# Patient Record
Sex: MALE | Race: BLACK OR AFRICAN AMERICAN | Employment: FULL TIME | URBAN - METROPOLITAN AREA
[De-identification: names, ages, dates, MRNs, and addresses within clinical notes are randomized per-mention and may not be internally consistent; named-entity substitution may affect disease eponyms.]

---

## 2017-07-31 ENCOUNTER — APPOINTMENT (OUTPATIENT)
Dept: GENERAL RADIOLOGY | Age: 47
End: 2017-07-31
Attending: EMERGENCY MEDICINE
Payer: OTHER GOVERNMENT

## 2017-07-31 ENCOUNTER — HOSPITAL ENCOUNTER (EMERGENCY)
Age: 47
Discharge: HOME | End: 2017-07-31
Attending: EMERGENCY MEDICINE
Payer: OTHER GOVERNMENT

## 2017-07-31 VITALS
DIASTOLIC BLOOD PRESSURE: 77 MMHG | OXYGEN SATURATION: 98 % | RESPIRATION RATE: 13 BRPM | BODY MASS INDEX: 26.48 KG/M2 | SYSTOLIC BLOOD PRESSURE: 140 MMHG | HEIGHT: 70 IN | HEART RATE: 56 BPM | WEIGHT: 185 LBS

## 2017-07-31 DIAGNOSIS — M25.519 ARTHRALGIA OF SHOULDER, UNSPECIFIED LATERALITY: ICD-10-CM

## 2017-07-31 DIAGNOSIS — R07.9 CHEST PAIN, UNSPECIFIED TYPE: Primary | ICD-10-CM

## 2017-07-31 LAB
ANION GAP BLD CALC-SCNC: 8 MMOL/L (ref 3–18)
ATRIAL RATE: 65 BPM
BASOPHILS # BLD AUTO: 0 K/UL (ref 0–0.06)
BASOPHILS # BLD: 1 % (ref 0–2)
BUN SERPL-MCNC: 9 MG/DL (ref 7–18)
BUN/CREAT SERPL: 8 (ref 12–20)
CALCIUM SERPL-MCNC: 7.9 MG/DL (ref 8.5–10.1)
CALCULATED P AXIS, ECG09: 37 DEGREES
CALCULATED R AXIS, ECG10: -34 DEGREES
CALCULATED T AXIS, ECG11: -6 DEGREES
CHLORIDE SERPL-SCNC: 107 MMOL/L (ref 100–108)
CK MB CFR SERPL CALC: 0.4 % (ref 0–4)
CK MB CFR SERPL CALC: NORMAL % (ref 0–4)
CK MB SERPL-MCNC: 1.1 NG/ML (ref 5–25)
CK MB SERPL-MCNC: <1 NG/ML (ref 5–25)
CK SERPL-CCNC: 233 U/L (ref 39–308)
CK SERPL-CCNC: 266 U/L (ref 39–308)
CO2 SERPL-SCNC: 30 MMOL/L (ref 21–32)
CREAT SERPL-MCNC: 1.11 MG/DL (ref 0.6–1.3)
D DIMER PPP FEU-MCNC: <0.27 UG/ML(FEU)
DIAGNOSIS, 93000: NORMAL
DIFFERENTIAL METHOD BLD: ABNORMAL
EOSINOPHIL # BLD: 0.3 K/UL (ref 0–0.4)
EOSINOPHIL NFR BLD: 4 % (ref 0–5)
ERYTHROCYTE [DISTWIDTH] IN BLOOD BY AUTOMATED COUNT: 13.1 % (ref 11.6–14.5)
GLUCOSE SERPL-MCNC: 138 MG/DL (ref 74–99)
HCT VFR BLD AUTO: 45.3 % (ref 36–48)
HGB BLD-MCNC: 15.3 G/DL (ref 13–16)
LIPASE SERPL-CCNC: 272 U/L (ref 73–393)
LYMPHOCYTES # BLD AUTO: 35 % (ref 21–52)
LYMPHOCYTES # BLD: 2.3 K/UL (ref 0.9–3.6)
MCH RBC QN AUTO: 32.8 PG (ref 24–34)
MCHC RBC AUTO-ENTMCNC: 33.8 G/DL (ref 31–37)
MCV RBC AUTO: 97.2 FL (ref 74–97)
MONOCYTES # BLD: 0.4 K/UL (ref 0.05–1.2)
MONOCYTES NFR BLD AUTO: 7 % (ref 3–10)
NEUTS SEG # BLD: 3.5 K/UL (ref 1.8–8)
NEUTS SEG NFR BLD AUTO: 53 % (ref 40–73)
P-R INTERVAL, ECG05: 156 MS
PLATELET # BLD AUTO: 176 K/UL (ref 135–420)
PMV BLD AUTO: 9.2 FL (ref 9.2–11.8)
POTASSIUM SERPL-SCNC: 3.7 MMOL/L (ref 3.5–5.5)
Q-T INTERVAL, ECG07: 414 MS
QRS DURATION, ECG06: 94 MS
QTC CALCULATION (BEZET), ECG08: 430 MS
RBC # BLD AUTO: 4.66 M/UL (ref 4.7–5.5)
SODIUM SERPL-SCNC: 145 MMOL/L (ref 136–145)
TROPONIN I SERPL-MCNC: <0.02 NG/ML (ref 0–0.06)
TROPONIN I SERPL-MCNC: <0.02 NG/ML (ref 0–0.06)
VENTRICULAR RATE, ECG03: 65 BPM
WBC # BLD AUTO: 6.6 K/UL (ref 4.6–13.2)

## 2017-07-31 PROCEDURE — 80048 BASIC METABOLIC PNL TOTAL CA: CPT | Performed by: EMERGENCY MEDICINE

## 2017-07-31 PROCEDURE — 93005 ELECTROCARDIOGRAM TRACING: CPT

## 2017-07-31 PROCEDURE — 83690 ASSAY OF LIPASE: CPT | Performed by: EMERGENCY MEDICINE

## 2017-07-31 PROCEDURE — 85025 COMPLETE CBC W/AUTO DIFF WBC: CPT | Performed by: EMERGENCY MEDICINE

## 2017-07-31 PROCEDURE — 96361 HYDRATE IV INFUSION ADD-ON: CPT

## 2017-07-31 PROCEDURE — 99285 EMERGENCY DEPT VISIT HI MDM: CPT

## 2017-07-31 PROCEDURE — 85379 FIBRIN DEGRADATION QUANT: CPT | Performed by: EMERGENCY MEDICINE

## 2017-07-31 PROCEDURE — 71010 XR CHEST PORT: CPT

## 2017-07-31 PROCEDURE — 82550 ASSAY OF CK (CPK): CPT | Performed by: EMERGENCY MEDICINE

## 2017-07-31 PROCEDURE — 96374 THER/PROPH/DIAG INJ IV PUSH: CPT

## 2017-07-31 PROCEDURE — 74011250636 HC RX REV CODE- 250/636: Performed by: EMERGENCY MEDICINE

## 2017-07-31 PROCEDURE — 74011250637 HC RX REV CODE- 250/637: Performed by: EMERGENCY MEDICINE

## 2017-07-31 PROCEDURE — 96375 TX/PRO/DX INJ NEW DRUG ADDON: CPT

## 2017-07-31 RX ORDER — MORPHINE SULFATE 4 MG/ML
4 INJECTION, SOLUTION INTRAMUSCULAR; INTRAVENOUS
Status: DISCONTINUED | OUTPATIENT
Start: 2017-07-31 | End: 2017-07-31

## 2017-07-31 RX ORDER — ONDANSETRON 2 MG/ML
4 INJECTION INTRAMUSCULAR; INTRAVENOUS
Status: DISCONTINUED | OUTPATIENT
Start: 2017-07-31 | End: 2017-07-31

## 2017-07-31 RX ORDER — GUAIFENESIN 100 MG/5ML
81 LIQUID (ML) ORAL
Status: COMPLETED | OUTPATIENT
Start: 2017-07-31 | End: 2017-07-31

## 2017-07-31 RX ORDER — SODIUM CHLORIDE 9 MG/ML
1000 INJECTION, SOLUTION INTRAVENOUS ONCE
Status: COMPLETED | OUTPATIENT
Start: 2017-07-31 | End: 2017-07-31

## 2017-07-31 RX ADMIN — SODIUM CHLORIDE 1000 ML: 900 INJECTION, SOLUTION INTRAVENOUS at 04:14

## 2017-07-31 RX ADMIN — ASPIRIN 81 MG 81 MG: 81 TABLET ORAL at 04:09

## 2017-07-31 RX ADMIN — NITROGLYCERIN 1 INCH: 20 OINTMENT TOPICAL at 04:10

## 2017-07-31 NOTE — LETTER
NOTIFICATION RETURN TO WORK / SCHOOL 
 
7/31/2017 6:58 AM 
 
Mr. Emily Neely 
[address] [address] To Whom It May Concern: 
 
Emily Neely is currently under the care of [id number] [place] [address] EMERGENCY DEPT. He will return to work/school on: 8/3/17 If there are questions or concerns please have the patient contact our office.  
 
 
 
Sincerely, 
 
 
 
 
Mery Mohr MD

## 2017-07-31 NOTE — ED PROVIDER NOTES
HPI Comments: Pt c/o chest pain, left lower, woke him up from sleep, just pta, about 30-45 min ago. Pain 8/10, constant. No sob. No nausea. Mild left arm pain also. No fever, no leg pain or swelling. No abd pain. No prior cardiac eval. No prior stress testing or prio chest pain. + smoker. Patient is a 40 y.o. male presenting with chest pain. Chest Pain (Angina)    Pertinent negatives include no abdominal pain, no back pain, no cough, no diaphoresis, no dizziness, no fever, no nausea and no shortness of breath. No past medical history on file. No past surgical history on file. No family history on file. Social History     Social History    Marital status:      Spouse name: N/A    Number of children: N/A    Years of education: N/A     Occupational History    Not on file. Social History Main Topics    Smoking status: Current Some Day Smoker    Smokeless tobacco: Not on file    Alcohol use Yes    Drug use: No    Sexual activity: Not on file     Other Topics Concern    Not on file     Social History Narrative         ALLERGIES: Pollen extracts    Review of Systems   Constitutional: Negative for diaphoresis and fever. HENT: Negative for congestion. Respiratory: Negative for cough and shortness of breath. Cardiovascular: Positive for chest pain. Gastrointestinal: Negative for abdominal pain and nausea. Musculoskeletal: Negative for back pain. Skin: Negative for rash. Neurological: Negative for dizziness. All other systems reviewed and are negative. Vitals:    07/31/17 0630 07/31/17 0645 07/31/17 0700 07/31/17 0715   BP: 155/83 154/89 139/83 163/90   Pulse: 62 65 81 70   Resp: 12 13 20 13   SpO2: 97% 97%     Weight:       Height:                Physical Exam   Constitutional: He is oriented to person, place, and time. He appears well-developed and well-nourished. HENT:   Head: Normocephalic and atraumatic.    Eyes: Pupils are equal, round, and reactive to light. Neck: Neck supple. Cardiovascular: Normal rate. No murmur heard. Pulmonary/Chest: Effort normal. He has no wheezes. Abdominal: Soft. There is no tenderness. Musculoskeletal: He exhibits no tenderness. Neurological: He is alert and oriented to person, place, and time. Skin: No pallor. Nursing note and vitals reviewed. [facility]  ED Course       Procedures    Vitals:  Patient Vitals for the past 12 hrs:   Pulse Resp BP SpO2   07/31/17 0715 70 13 163/90 -   07/31/17 0700 81 20 139/83 -   07/31/17 0645 65 13 154/89 97 %   07/31/17 0630 62 12 155/83 97 %   07/31/17 0615 64 13 147/89 97 %   07/31/17 0600 (!) 59 13 (!) 151/92 99 %   07/31/17 0545 82 16 155/87 99 %   07/31/17 0500 62 16 (!) 156/98 100 %   07/31/17 0448 (!) 57 13 144/85 100 %   07/31/17 0445 60 13 - 100 %   07/31/17 0430 (!) 59 (!) 6 (!) 161/97 100 %   07/31/17 0415 (!) 57 8 (!) 162/94 100 %   07/31/17 0352 69 16 (!) 152/99 99 %         Medications ordered:   Medications   aspirin chewable tablet 81 mg (81 mg Oral Given 7/31/17 0409)   nitroglycerin (NITROBID) 2 % ointment 1 Inch (1 Inch Topical Given 7/31/17 0410)   0.9% sodium chloride infusion 1,000 mL (1,000 mL IntraVENous New Bag 7/31/17 0414)         Lab findings:  Recent Results (from the past 12 hour(s))   CBC WITH AUTOMATED DIFF    Collection Time: 07/31/17  3:52 AM   Result Value Ref Range    WBC 6.6 4.6 - 13.2 K/uL    RBC 4.66 (L) 4.70 - 5.50 M/uL    HGB 15.3 13.0 - 16.0 g/dL    HCT 45.3 36.0 - 48.0 %    MCV 97.2 (H) 74.0 - 97.0 FL    MCH 32.8 24.0 - 34.0 PG    MCHC 33.8 31.0 - 37.0 g/dL    RDW 13.1 11.6 - 14.5 %    PLATELET 950 121 - 640 K/uL    MPV 9.2 9.2 - 11.8 FL    NEUTROPHILS 53 40 - 73 %    LYMPHOCYTES 35 21 - 52 %    MONOCYTES 7 3 - 10 %    EOSINOPHILS 4 0 - 5 %    BASOPHILS 1 0 - 2 %    ABS. NEUTROPHILS 3.5 1.8 - 8.0 K/UL    ABS. LYMPHOCYTES 2.3 0.9 - 3.6 K/UL    ABS. MONOCYTES 0.4 0.05 - 1.2 K/UL    ABS. EOSINOPHILS 0.3 0.0 - 0.4 K/UL    ABS. BASOPHILS 0.0 0.0 - 0.06 K/UL    DF AUTOMATED     METABOLIC PANEL, BASIC    Collection Time: 07/31/17  3:52 AM   Result Value Ref Range    Sodium 145 136 - 145 mmol/L    Potassium 3.7 3.5 - 5.5 mmol/L    Chloride 107 100 - 108 mmol/L    CO2 30 21 - 32 mmol/L    Anion gap 8 3.0 - 18 mmol/L    Glucose 138 (H) 74 - 99 mg/dL    BUN 9 7.0 - 18 MG/DL    Creatinine 1.11 0.6 - 1.3 MG/DL    BUN/Creatinine ratio 8 (L) 12 - 20      GFR est AA >60 >60 ml/min/1.73m2    GFR est non-AA >60 >60 ml/min/1.73m2    Calcium 7.9 (L) 8.5 - 10.1 MG/DL   CARDIAC PANEL,(CK, CKMB & TROPONIN)    Collection Time: 07/31/17  3:52 AM   Result Value Ref Range     39 - 308 U/L    CK - MB 1.1 <3.6 ng/ml    CK-MB Index 0.4 0.0 - 4.0 %    Troponin-I, Qt. <0.02 0.00 - 0.06 NG/ML   D DIMER    Collection Time: 07/31/17  3:52 AM   Result Value Ref Range    D DIMER <0.27 <0.46 ug/ml(FEU)   LIPASE    Collection Time: 07/31/17  3:52 AM   Result Value Ref Range    Lipase 272 73 - 393 U/L   EKG, 12 LEAD, INITIAL    Collection Time: 07/31/17  3:52 AM   Result Value Ref Range    Ventricular Rate 65 BPM    Atrial Rate 65 BPM    P-R Interval 156 ms    QRS Duration 94 ms    Q-T Interval 414 ms    QTC Calculation (Bezet) 430 ms    Calculated P Axis 37 degrees    Calculated R Axis -34 degrees    Calculated T Axis -6 degrees    Diagnosis       Normal sinus rhythm  Left axis deviation  Abnormal ECG  No previous ECGs available     CARDIAC PANEL,(CK, CKMB & TROPONIN)    Collection Time: 07/31/17  6:56 AM   Result Value Ref Range     39 - 308 U/L    CK - MB <1.0 <3.6 ng/ml    CK-MB Index  0.0 - 4.0 %     CALCULATION NOT PERFORMED WHEN RESULT IS BELOW LINEAR LIMIT    Troponin-I, Qt. <0.02 0.00 - 0.06 NG/ML           X-Ray, CT or other radiology findings or impressions:  XR CHEST PORT   Final Result   IMPRESSION:     No acute findings       Progress notes, Consult notes or additional Procedure notes:   5:10 AM pt feeling better, pain now 3/10  6:06 AM pt feels much better, says pain 1/10  6:57 AM pt currently pain free, denies complaints. 2nd set of cardiac markers pending. Pt declines admit unless 2nd set abnl. Otherwise requests dc. Signed out to Dr Anupama Benito 0700    Disposition:  Diagnosis:   1. Chest pain, unspecified type            Follow-up Information     None           Patient's Medications   Start Taking    No medications on file   Continue Taking    AZITHROMYCIN (ZITHROMAX Z-MARIO ALBERTO) 250 MG TABLET    Take as written    FEXOFENADINE-PSEUDOEPHEDRINE (ALLEGRA-D 24 HOUR) 180-240 MG PER TABLET    Take 1 Tab by mouth as needed. These Medications have changed    No medications on file   Stop Taking    No medications on file            7:00 AM : Pt care transferred to Dr. Anupama Benito  ,ED provider. History of patient complaint(s), available diagnostic reports and current treatment plan has been discussed thoroughly. Bedside rounding on patient occured : yes .   Intended disposition of patient : TBD  Pending diagnostics reports and/or labs (please list):

## 2017-07-31 NOTE — ED NOTES
Pt resting in ER tx room in NAD. Pt still not req any pain medication. Plan of care reviewed with patient.   No questions or concerns

## 2017-07-31 NOTE — ED NOTES
9:38 AM :Pt care assumed from Dr. Michael Benjamin , ED provider. Pt complaint(s), current treatment plan, progression and available diagnostic results have been discussed thoroughly. Rounding occurred: yes  Intended Disposition: Home   Pending diagnostic reports and/or labs (please list):     9:39 AM  Pt had no pain since being in ER. Pt feels great and states pain was similar to when he had a pinched nerve in his neck. Pt c/o CP but primary pain was in neck and shoulder. Pt TMI score is low will be d/c for f/u stress test in 24 hr.     Scribe Attestation  Ahsan Aguirre scribing for and in the presence of Warden Elena 9:40 AM, 07/31/17. Physician Attestation  I personally performed the services described in the documentation, reviewed the documentation, as recorded by the scribe in my presence, and it accurately and completely records my words and actions.     Warden Elena 9:40 AM 07/31/17        Signed by : Elías Paige, 07/31/17 at 9:40 AM

## 2017-07-31 NOTE — DISCHARGE INSTRUCTIONS
Chest Pain: Care Instructions  Your Care Instructions  There are many things that can cause chest pain. Some are not serious and will get better on their own in a few days. But some kinds of chest pain need more testing and treatment. Your doctor may have recommended a follow-up visit in the next 8 to 12 hours. If you are not getting better, you may need more tests or treatment. Even though your doctor has released you, you still need to watch for any problems. The doctor carefully checked you, but sometimes problems can develop later. If you have new symptoms or if your symptoms do not get better, get medical care right away. If you have worse or different chest pain or pressure that lasts more than 5 minutes or you passed out (lost consciousness), call 911 or seek other emergency help right away. A medical visit is only one step in your treatment. Even if you feel better, you still need to do what your doctor recommends, such as going to all suggested follow-up appointments and taking medicines exactly as directed. This will help you recover and help prevent future problems. How can you care for yourself at home? · Rest until you feel better. · Take your medicine exactly as prescribed. Call your doctor if you think you are having a problem with your medicine. · Do not drive after taking a prescription pain medicine. When should you call for help? Call 911 if:  · You passed out (lost consciousness). · You have severe difficulty breathing. · You have symptoms of a heart attack. These may include:  ¨ Chest pain or pressure, or a strange feeling in your chest.  ¨ Sweating. ¨ Shortness of breath. ¨ Nausea or vomiting. ¨ Pain, pressure, or a strange feeling in your back, neck, jaw, or upper belly or in one or both shoulders or arms. ¨ Lightheadedness or sudden weakness. ¨ A fast or irregular heartbeat.   After you call 911, the  may tell you to chew 1 adult-strength or 2 to 4 low-dose aspirin. Wait for an ambulance. Do not try to drive yourself. Call your doctor today if:  · You have any trouble breathing. · Your chest pain gets worse. · You are dizzy or lightheaded, or you feel like you may faint. · You are not getting better as expected. · You are having new or different chest pain. Where can you learn more? Go to http://low-kaylee.info/. Enter A120 in the search box to learn more about \"Chest Pain: Care Instructions. \"  Current as of: March 20, 2017  Content Version: 11.3  © 7647-0113 [employer]. Care instructions adapted under license by [employer] (which disclaims liability or warranty for this information). If you have questions about a medical condition or this instruction, always ask your healthcare professional. [address] any warranty or liability for your use of this information.

## 2017-07-31 NOTE — ED NOTES
Verbal shift change report given to Ernesto Arce RN (oncoming nurse) by Tylor Reed RN (offgoing nurse). Report included the following information SBAR, Kardex and ED Summary.

## 2023-02-03 ENCOUNTER — HOSPITAL ENCOUNTER (EMERGENCY)
Age: 53
Discharge: HOME | End: 2023-02-03
Attending: STUDENT IN AN ORGANIZED HEALTH CARE EDUCATION/TRAINING PROGRAM
Payer: OTHER GOVERNMENT

## 2023-02-03 VITALS
HEART RATE: 76 BPM | TEMPERATURE: 97.7 F | OXYGEN SATURATION: 96 % | RESPIRATION RATE: 20 BRPM | WEIGHT: 185 LBS | DIASTOLIC BLOOD PRESSURE: 102 MMHG | SYSTOLIC BLOOD PRESSURE: 190 MMHG | HEIGHT: 70 IN | BODY MASS INDEX: 26.48 KG/M2

## 2023-02-03 DIAGNOSIS — N48.9 PENILE LESION: Primary | ICD-10-CM

## 2023-02-03 LAB
APPEARANCE UR: CLEAR
BILIRUB UR QL: NEGATIVE
C TRACH RRNA SPEC QL NAA+PROBE: NEGATIVE
COLOR UR: YELLOW
EPITH CASTS URNS QL MICRO: NORMAL /LPF (ref 0–5)
GLUCOSE UR STRIP.AUTO-MCNC: NEGATIVE MG/DL
HGB UR QL STRIP: ABNORMAL
KETONES UR QL STRIP.AUTO: ABNORMAL MG/DL
LEUKOCYTE ESTERASE UR QL STRIP.AUTO: NEGATIVE
N GONORRHOEA RRNA SPEC QL NAA+PROBE: NEGATIVE
NITRITE UR QL STRIP.AUTO: NEGATIVE
PH UR STRIP: 6.5 (ref 5–8)
PROT UR STRIP-MCNC: ABNORMAL MG/DL
RBC #/AREA URNS HPF: NORMAL /HPF (ref 0–5)
SP GR UR REFRACTOMETRY: 1.02 (ref 1–1.03)
SPECIMEN SOURCE: NORMAL
UROBILINOGEN UR QL STRIP.AUTO: 1 EU/DL (ref 0.2–1)
WBC URNS QL MICRO: NORMAL /HPF (ref 0–4)

## 2023-02-03 PROCEDURE — 99283 EMERGENCY DEPT VISIT LOW MDM: CPT

## 2023-02-03 PROCEDURE — 87491 CHLMYD TRACH DNA AMP PROBE: CPT

## 2023-02-03 PROCEDURE — 81001 URINALYSIS AUTO W/SCOPE: CPT

## 2023-02-03 RX ORDER — NIFEDIPINE 60 MG/1
TABLET, EXTENDED RELEASE ORAL
COMMUNITY
Start: 2023-01-17

## 2023-02-03 RX ORDER — VALACYCLOVIR HYDROCHLORIDE 1 G/1
1000 TABLET, FILM COATED ORAL 3 TIMES DAILY
Qty: 21 TABLET | Refills: 0 | Status: SHIPPED | OUTPATIENT
Start: 2023-02-03 | End: 2023-02-10

## 2023-02-03 NOTE — ED NOTES
Pt states he is here for an open wound on shaft of penis x 5-7 days. Pt states he is not concerned for STIs as he has one sexual partner (spouse), but would like to be tested for herpes due to the open wound on penis.

## 2023-02-03 NOTE — ED PROVIDER NOTES
EMERGENCY DEPARTMENT HISTORY AND PHYSICAL EXAM      Date: 2/3/2023  Patient Name: Eugene Nicolas    History of Presenting Illness     Chief Complaint   Patient presents with    Skin Problem         History Provided By: Patient    Patient is a 58-year-old male with a history of shingles who is presenting to the ED after noticing a scab in the base of his penis. He is concerned for another shingles outbreak as his most recent outbreak in 2013 involved his penis. He has no new sexual partners and is not concerned for an STD. He denies history of STDs. He denies trauma to the area and says a scab came off in the shower. He denies fevers, chills, lesions elsewhere, dysuria, frequency, testicular or penile pain. PCP: Unknown, Provider    Current Outpatient Medications   Medication Sig Dispense Refill    NIFEdipine ER (ADALAT CC) 60 mg ER tablet       valACYclovir (VALTREX) 1 gram tablet Take 1 Tablet by mouth three (3) times daily for 7 days. 21 Tablet 0    fexofenadine-pseudoephedrine (ALLEGRA-D 24) 180-240 mg per tablet Take 1 Tab by mouth as needed. (Patient not taking: Reported on 2/3/2023)      azithromycin (ZITHROMAX Z-MARIO ALBERTO) 250 mg tablet Take as written (Patient not taking: Reported on 2/3/2023) 1 Package 0       Past History     Past Medical History:  History reviewed. No pertinent past medical history. Past Surgical History:  History reviewed. No pertinent surgical history. Family History:  History reviewed. No pertinent family history. Social History:  Social History     Tobacco Use    Smoking status: Some Days   Vaping Use    Vaping Use: Never used   Substance Use Topics    Alcohol use: Yes    Drug use: No       Allergies: Allergies   Allergen Reactions    Pollen Extracts Cough         Review of Systems     Denies lymphadenopathy, abdominal pain, nausea, vomiting, diarrhea or constipation. Review of Systems   Constitutional:  Negative for fatigue and fever.    Genitourinary:  Positive for genital sores. Negative for dysuria, penile discharge, penile pain, penile swelling, scrotal swelling, testicular pain and urgency. Skin:  Negative for rash and wound. Physical Exam   Visit Vitals  BP (!) 190/102 (BP 1 Location: Left upper arm, BP Patient Position: At rest;Sitting)   Pulse 76   Temp 97.7 °F (36.5 °C)   Resp 20   Ht 5' 10\" (1.778 m)   Wt 83.9 kg (185 lb)   SpO2 96%   BMI 26.54 kg/m²         Physical Exam  Genitourinary:     Testes: Normal.      Comments: Ulcer of base of penis, left. , normal glans, no discharge  Skin:     Capillary Refill: Capillary refill takes less than 2 seconds. Findings: Lesion (as above in  exam) present. No bruising, erythema or rash. 1 cm circular ulcer on the left base of the penis. No penile discharge or other lesions seen. No inguinal lymphadenopathy. Diagnostic Study Results     Labs -  No results found for this or any previous visit (from the past 12 hour(s)). Radiologic Studies -   No orders to display           Medical Decision Making   I am the first provider for this patient. I reviewed the vital signs, available nursing notes, past medical history, past surgical history, family history and social history. Records Reviewed:  (Time of Review: 10:12 AM)    Vital Signs-Reviewed the patient's vital signs. EKG:     ED Course: Progress Notes, Reevaluation, and Consults:    Provider Notes (Medical Decision Making):       MDM  Number of Diagnoses or Management Options  Penile lesion  Diagnosis management comments: Ddx: herpes, sti, syphillis, shingles    Patient is overall well-appearing. Physical exam demonstrating simple painless ulcer on the inside of the base of his penis without any other appreciated lesions. No surrounding erythema. Patient is adamant that he has only been sexually active with his spouse has not had intercourse in over 2 weeks.   I have discussed the possibility of syphilis patient does not want to be tested for this or treatment for this at this time. Is requesting Valtrex. Patient will be prescribed this and discharged      STD panel ordered to rule out syphilis           Procedures          Diagnosis     Clinical Impression:   1. Penile lesion        Disposition: disharged home    Follow-up Information       Follow up With Specialties Details Why Anilvesnavelma  EMERGENCY DEPT Emergency Medicine  As needed, If symptoms worsen [address] [facility]  [phone]             Patient's Medications   Start Taking    VALACYCLOVIR (VALTREX) 1 GRAM TABLET    Take 1 Tablet by mouth three (3) times daily for 7 days. Continue Taking    AZITHROMYCIN (ZITHROMAX Z-MARIO ALBERTO) 250 MG TABLET    Take as written    FEXOFENADINE-PSEUDOEPHEDRINE (ALLEGRA-D 24) 180-240 MG PER TABLET    Take 1 Tab by mouth as needed. NIFEDIPINE ER (ADALAT CC) 60 MG ER TABLET       These Medications have changed    No medications on file   Stop Taking    No medications on file     Disclaimer: Sections of this note are dictated using utilizing voice recognition software. Minor typographical errors may be present. If questions arise, please do not hesitate to contact me or call our department.

## 2023-02-03 NOTE — ED TRIAGE NOTES
Patient states Romelia Garland has a previous history of either shingles or a possible STD he states \"he has bump on the base of his penis\".